# Patient Record
Sex: MALE | Race: WHITE | NOT HISPANIC OR LATINO | Employment: OTHER | URBAN - METROPOLITAN AREA
[De-identification: names, ages, dates, MRNs, and addresses within clinical notes are randomized per-mention and may not be internally consistent; named-entity substitution may affect disease eponyms.]

---

## 2022-01-29 ENCOUNTER — HOSPITAL ENCOUNTER (EMERGENCY)
Facility: HOSPITAL | Age: 52
Discharge: HOME | End: 2022-01-29
Attending: INTERNAL MEDICINE
Payer: MEDICARE

## 2022-01-29 VITALS
TEMPERATURE: 98 F | BODY MASS INDEX: 25.11 KG/M2 | WEIGHT: 160 LBS | HEART RATE: 84 BPM | DIASTOLIC BLOOD PRESSURE: 110 MMHG | SYSTOLIC BLOOD PRESSURE: 132 MMHG | OXYGEN SATURATION: 100 % | RESPIRATION RATE: 18 BRPM | HEIGHT: 67 IN

## 2022-01-29 DIAGNOSIS — R53.1 WEAKNESS GENERALIZED: Primary | ICD-10-CM

## 2022-01-29 DIAGNOSIS — R11.0 NAUSEA: ICD-10-CM

## 2022-01-29 LAB
ALBUMIN SERPL BCP-MCNC: 3.9 G/DL (ref 3.5–5.2)
ALP SERPL-CCNC: 89 U/L (ref 55–135)
ALT SERPL W/O P-5'-P-CCNC: 19 U/L (ref 10–44)
ANION GAP SERPL CALC-SCNC: 13 MMOL/L (ref 8–16)
AST SERPL-CCNC: 22 U/L (ref 10–40)
BASOPHILS # BLD AUTO: 0.02 K/UL (ref 0–0.2)
BASOPHILS NFR BLD: 0.3 % (ref 0–1.9)
BILIRUB SERPL-MCNC: 0.4 MG/DL (ref 0.1–1)
BILIRUB UR QL STRIP: NEGATIVE
BUN SERPL-MCNC: 16 MG/DL (ref 6–20)
CALCIUM SERPL-MCNC: 10 MG/DL (ref 8.7–10.5)
CHLORIDE SERPL-SCNC: 101 MMOL/L (ref 95–110)
CLARITY UR: CLEAR
CO2 SERPL-SCNC: 24 MMOL/L (ref 23–29)
COLOR UR: YELLOW
CREAT SERPL-MCNC: 1 MG/DL (ref 0.5–1.4)
DIFFERENTIAL METHOD: ABNORMAL
EOSINOPHIL # BLD AUTO: 0.5 K/UL (ref 0–0.5)
EOSINOPHIL NFR BLD: 7 % (ref 0–8)
ERYTHROCYTE [DISTWIDTH] IN BLOOD BY AUTOMATED COUNT: 14.2 % (ref 11.5–14.5)
EST. GFR  (AFRICAN AMERICAN): >60 ML/MIN/1.73 M^2
EST. GFR  (NON AFRICAN AMERICAN): >60 ML/MIN/1.73 M^2
GLUCOSE SERPL-MCNC: 106 MG/DL (ref 70–110)
GLUCOSE UR QL STRIP: NEGATIVE
HCT VFR BLD AUTO: 37.1 % (ref 40–54)
HGB BLD-MCNC: 12.1 G/DL (ref 14–18)
HGB UR QL STRIP: NEGATIVE
IMM GRANULOCYTES # BLD AUTO: 0.01 K/UL (ref 0–0.04)
IMM GRANULOCYTES NFR BLD AUTO: 0.2 % (ref 0–0.5)
KETONES UR QL STRIP: NEGATIVE
LEUKOCYTE ESTERASE UR QL STRIP: NEGATIVE
LIPASE SERPL-CCNC: 25 U/L (ref 4–60)
LYMPHOCYTES # BLD AUTO: 1.9 K/UL (ref 1–4.8)
LYMPHOCYTES NFR BLD: 29.1 % (ref 18–48)
MCH RBC QN AUTO: 26.1 PG (ref 27–31)
MCHC RBC AUTO-ENTMCNC: 32.6 G/DL (ref 32–36)
MCV RBC AUTO: 80 FL (ref 82–98)
MONOCYTES # BLD AUTO: 0.6 K/UL (ref 0.3–1)
MONOCYTES NFR BLD: 8.9 % (ref 4–15)
NEUTROPHILS # BLD AUTO: 3.6 K/UL (ref 1.8–7.7)
NEUTROPHILS NFR BLD: 54.5 % (ref 38–73)
NITRITE UR QL STRIP: NEGATIVE
NRBC BLD-RTO: 0 /100 WBC
PH UR STRIP: 6 [PH] (ref 5–8)
PLATELET # BLD AUTO: 225 K/UL (ref 150–450)
PMV BLD AUTO: 10.4 FL (ref 9.2–12.9)
POTASSIUM SERPL-SCNC: 3.7 MMOL/L (ref 3.5–5.1)
PROT SERPL-MCNC: 6.7 G/DL (ref 6–8.4)
PROT UR QL STRIP: NEGATIVE
RBC # BLD AUTO: 4.63 M/UL (ref 4.6–6.2)
SODIUM SERPL-SCNC: 138 MMOL/L (ref 136–145)
SP GR UR STRIP: >=1.03 (ref 1–1.03)
URN SPEC COLLECT METH UR: ABNORMAL
UROBILINOGEN UR STRIP-ACNC: NEGATIVE EU/DL
WBC # BLD AUTO: 6.6 K/UL (ref 3.9–12.7)

## 2022-01-29 PROCEDURE — 99284 EMERGENCY DEPT VISIT MOD MDM: CPT | Mod: 25

## 2022-01-29 PROCEDURE — 96374 THER/PROPH/DIAG INJ IV PUSH: CPT

## 2022-01-29 PROCEDURE — 85025 COMPLETE CBC W/AUTO DIFF WBC: CPT | Performed by: INTERNAL MEDICINE

## 2022-01-29 PROCEDURE — 63600175 PHARM REV CODE 636 W HCPCS: Performed by: INTERNAL MEDICINE

## 2022-01-29 PROCEDURE — 83690 ASSAY OF LIPASE: CPT | Performed by: INTERNAL MEDICINE

## 2022-01-29 PROCEDURE — 93005 ELECTROCARDIOGRAM TRACING: CPT

## 2022-01-29 PROCEDURE — 81003 URINALYSIS AUTO W/O SCOPE: CPT | Performed by: INTERNAL MEDICINE

## 2022-01-29 PROCEDURE — 80053 COMPREHEN METABOLIC PANEL: CPT | Performed by: INTERNAL MEDICINE

## 2022-01-29 PROCEDURE — 93010 ELECTROCARDIOGRAM REPORT: CPT | Mod: ,,, | Performed by: INTERNAL MEDICINE

## 2022-01-29 PROCEDURE — 93010 EKG 12-LEAD: ICD-10-PCS | Mod: ,,, | Performed by: INTERNAL MEDICINE

## 2022-01-29 RX ORDER — METFORMIN HYDROCHLORIDE 500 MG/1
500 TABLET, EXTENDED RELEASE ORAL DAILY
COMMUNITY
Start: 2022-01-24

## 2022-01-29 RX ORDER — ATORVASTATIN CALCIUM 10 MG/1
10 TABLET, FILM COATED ORAL DAILY
COMMUNITY
Start: 2021-12-03 | End: 2024-03-19

## 2022-01-29 RX ORDER — LEVOTHYROXINE SODIUM 50 UG/1
50 TABLET ORAL DAILY
COMMUNITY
Start: 2022-01-24

## 2022-01-29 RX ORDER — CLOPIDOGREL BISULFATE 75 MG/1
75 TABLET ORAL DAILY
COMMUNITY
Start: 2022-01-24

## 2022-01-29 RX ORDER — ONDANSETRON 2 MG/ML
4 INJECTION INTRAMUSCULAR; INTRAVENOUS
Status: COMPLETED | OUTPATIENT
Start: 2022-01-29 | End: 2022-01-29

## 2022-01-29 RX ORDER — RISPERIDONE 1 MG/1
1 TABLET ORAL 2 TIMES DAILY
COMMUNITY
Start: 2022-01-24 | End: 2024-03-19

## 2022-01-29 RX ORDER — LISINOPRIL AND HYDROCHLOROTHIAZIDE 10; 12.5 MG/1; MG/1
1 TABLET ORAL DAILY
COMMUNITY
Start: 2022-01-24 | End: 2024-03-19

## 2022-01-29 RX ADMIN — ONDANSETRON 4 MG: 2 INJECTION INTRAMUSCULAR; INTRAVENOUS at 08:01

## 2022-01-29 NOTE — ED NOTES
Spoke with [facility] and they stated they are still in the process of trying to find him a stretcher trip.

## 2022-01-29 NOTE — ED NOTES
[facility] was called and prior authorization was requested. Eufemia, from [facility], stated we would be contacted when transportation became available. Pt. Authorization number given by [facility] is [id number].

## 2022-01-29 NOTE — ED NOTES
[facility] contacted to get update of transportation. [facility] stated they have not received any information for transportation for [facility]TM and advised me to call [facility] to get updated on status of transportation set up.

## 2022-01-29 NOTE — ED NOTES
Attempted to call family to pick pt up but there was not an option to leave voicemail. Will attempt to call again.

## 2022-01-29 NOTE — ED NOTES
"Ems Reports to the patient stating "I feel hot" "I just dont feel well" patient is afebrile in triage and upon EMS arrival, EMS also states that the patient resides with the mother and father, the patients father is currently recovering from a hip replacement and the mother is caring for both and was concerned when he wouldn't help her with his normal morning routine. She was concerned about his nausea and his complaints of weakness and generalized malaise. Patient in calm, cooperative, AOx3 and in no acute distress. Currently the patient is sleeping.   "

## 2022-01-29 NOTE — ED NOTES
[facility] called to set up transportation with [facility] and [facility] stated they needed prioauthorization. [facility] gave me the number to [facility] at [phone] to receive prior authorization.

## 2022-01-29 NOTE — ED NOTES
Spoke with kiera at [facility] for update on transportation. Kiera stated dispatch was contacted and we should receive a call from [facility] to set up transportation soon.

## 2022-01-29 NOTE — ED NOTES
Called pt. Family and family stated they are unable to come get him because his caregivers are not able to get him in the car or in the house once they get home. I told family I would discuss situation with charge nurse and call them back when we find a solution.

## 2022-01-29 NOTE — ED PROVIDER NOTES
Encounter Date: 1/29/2022       History   No chief complaint on file.    THE PATIENT BROUGHT IN BY AMBULANCE COMPLAINING OF FEELING NAUSEATED AND HOT THIS MORNING.  THERE IS NO HISTORY OF ANY PAIN INCLUDING ABDOMINAL PAIN, VOMITING, CHEST PAIN, HEADACHE.  WHEN PARAMEDICS ARRIVED HE WAS AFEBRILE WITH NORMAL VITAL SIGNS.  THE PATIENT IS BOWEL MOVEMENT WAS YESTERDAY AND THERE IS NO HISTORY OF ANY BLEEDING.    THE PATIENT IS STATUS POST OLD LEFT CVA AND HE IS SENT TO [facility]ED [facility].  HE HAS COMPLETE PARALYSIS ON THE LEFT SIDE.        Review of patient's allergies indicates:   Allergen Reactions    Ampicillin      Past Medical History:   Diagnosis Date    Diabetes mellitus     Hypertension     Stroke     Thyroid disease      History reviewed. No pertinent surgical history.  History reviewed. No pertinent family history.  Social History     Tobacco Use    Smoking status: Never Smoker    Smokeless tobacco: Never Used   Substance Use Topics    Alcohol use: Never    Drug use: Never     Review of Systems   Constitutional: Negative for fever.   HENT: Negative for sore throat.    Respiratory: Negative for shortness of breath.    Cardiovascular: Negative for chest pain.   Gastrointestinal: Negative for nausea.   Genitourinary: Negative for dysuria.   Musculoskeletal: Negative for back pain.   Skin: Negative for rash.   Neurological: Negative for weakness.   Hematological: Does not bruise/bleed easily.       Physical Exam     Initial Vitals [01/29/22 0725]   BP Pulse Resp Temp SpO2   109/75 88 18 98 °F (36.7 °C) 99 %      MAP       --         Physical Exam    Vitals reviewed.  Constitutional: He appears well-developed.   HENT:   Head: Normocephalic.   Eyes: EOM are normal. Pupils are equal, round, and reactive to light.   Neck: Neck supple.   Normal range of motion.  Cardiovascular: Normal rate and regular rhythm.   Pulmonary/Chest: Breath sounds normal.   Abdominal: Abdomen is soft. Bowel sounds are normal. There is no  abdominal tenderness. There is no guarding.   Musculoskeletal:         General: No tenderness or edema.      Cervical back: Normal range of motion and neck supple.     Neurological: He is alert and oriented to person, place, and time.   LEFT HEMIPLEGIA, ALERT ORIENTED FOLLOWS COMMANDS MOVES RIGHT SIDE.   Skin: Skin is warm.         ED Course   Procedures  Labs Reviewed   CBC W/ AUTO DIFFERENTIAL - Abnormal; Notable for the following components:       Result Value    Hemoglobin 12.1 (*)     Hematocrit 37.1 (*)     MCV 80 (*)     MCH 26.1 (*)     All other components within normal limits   URINALYSIS, REFLEX TO URINE CULTURE - Abnormal; Notable for the following components:    Specific Gravity, UA >=1.030 (*)     All other components within normal limits    Narrative:     Preferred Collection Type->Urine, Clean Catch  Specimen Source->Urine   COMPREHENSIVE METABOLIC PANEL   LIPASE        ECG Results          EKG 12-lead (Preliminary result)  Result time 01/29/22 07:47:04    ED Interpretation by Jerrell Weinstein MD (01/29/22 07:47:04, [facility], [place], operated by [facility], Emergency Medicine)    Normal sinus rhythm with a PAC, rate of 90 and no acute ischemic                            Imaging Results    None          Medications   ondansetron injection 4 mg (4 mg Intravenous Given 1/29/22 0811)                 ED Course as of 01/29/22 1008   Sat Jan 29, 2022   0747 EKG 12-lead [PW]   0804 CBC W/ AUTO DIFFERENTIAL(!) [PW]   0809 PT ASLEEP, NO NAUSEA. [PW]   0835 Lipase [PW]   0835 Comp. Metabolic Panel [PW]   1003 Urinalysis, Reflex to Urine Culture Urine, Clean Catch(!) [PW]   1004 THE PATIENT STATES HE IS HUNGRY AND WENT OVER LABS BUT HE SAYS NO LONGER NAUSEATED. [PW]      ED Course User Index  [PW] Jerrell Weinstein MD             Clinical Impression:   Final diagnoses:  [R11.0] Nausea  [R53.1] Weakness generalized (Primary)          ED Disposition Condition    Discharge Stable        ED Prescriptions     None        Follow-up  Information     Follow up With Specialties Details Why Contact Info    S. Raj Rosas MD Family Medicine Schedule an appointment as soon as possible for a visit in 2 days  [address] [address]  [address]  [facility] [place] [address]  [phone]             Jerrell Weinstein MD  01/29/22 1006

## 2022-04-22 ENCOUNTER — HOSPITAL ENCOUNTER (EMERGENCY)
Facility: HOSPITAL | Age: 52
Discharge: HOME | End: 2022-04-23
Attending: EMERGENCY MEDICINE
Payer: MEDICARE

## 2022-04-22 DIAGNOSIS — R63.0 APPETITE LOSS: ICD-10-CM

## 2022-04-22 DIAGNOSIS — N17.9 AKI (ACUTE KIDNEY INJURY): ICD-10-CM

## 2022-04-22 DIAGNOSIS — B37.0 ORAL THRUSH: ICD-10-CM

## 2022-04-22 DIAGNOSIS — E86.0 DEHYDRATION: Primary | ICD-10-CM

## 2022-04-22 DIAGNOSIS — Z63.8 LACK OF FAMILY SUPPORT: ICD-10-CM

## 2022-04-22 LAB
ALBUMIN SERPL BCP-MCNC: 3.7 G/DL (ref 3.5–5.2)
ALP SERPL-CCNC: 66 U/L (ref 55–135)
ALT SERPL W/O P-5'-P-CCNC: 11 U/L (ref 10–44)
ANION GAP SERPL CALC-SCNC: 11 MMOL/L (ref 8–16)
AST SERPL-CCNC: 17 U/L (ref 10–40)
BASOPHILS # BLD AUTO: 0.02 K/UL (ref 0–0.2)
BASOPHILS NFR BLD: 0.3 % (ref 0–1.9)
BILIRUB SERPL-MCNC: 0.4 MG/DL (ref 0.1–1)
BILIRUB UR QL STRIP: NEGATIVE
BUN SERPL-MCNC: 23 MG/DL (ref 6–20)
CALCIUM SERPL-MCNC: 9.5 MG/DL (ref 8.7–10.5)
CHLORIDE SERPL-SCNC: 103 MMOL/L (ref 95–110)
CLARITY UR: CLEAR
CO2 SERPL-SCNC: 24 MMOL/L (ref 23–29)
COLOR UR: YELLOW
CREAT SERPL-MCNC: 1.6 MG/DL (ref 0.5–1.4)
DIFFERENTIAL METHOD: ABNORMAL
EOSINOPHIL # BLD AUTO: 0.2 K/UL (ref 0–0.5)
EOSINOPHIL NFR BLD: 3.4 % (ref 0–8)
ERYTHROCYTE [DISTWIDTH] IN BLOOD BY AUTOMATED COUNT: 13 % (ref 11.5–14.5)
EST. GFR  (AFRICAN AMERICAN): 57.2 ML/MIN/1.73 M^2
EST. GFR  (NON AFRICAN AMERICAN): 49.5 ML/MIN/1.73 M^2
GLUCOSE SERPL-MCNC: 83 MG/DL (ref 70–110)
GLUCOSE UR QL STRIP: NEGATIVE
HCT VFR BLD AUTO: 34.7 % (ref 40–54)
HGB BLD-MCNC: 11.3 G/DL (ref 14–18)
HGB UR QL STRIP: NEGATIVE
IMM GRANULOCYTES # BLD AUTO: 0.01 K/UL (ref 0–0.04)
IMM GRANULOCYTES NFR BLD AUTO: 0.2 % (ref 0–0.5)
KETONES UR QL STRIP: NEGATIVE
LEUKOCYTE ESTERASE UR QL STRIP: NEGATIVE
LYMPHOCYTES # BLD AUTO: 1.7 K/UL (ref 1–4.8)
LYMPHOCYTES NFR BLD: 29.1 % (ref 18–48)
MCH RBC QN AUTO: 26.7 PG (ref 27–31)
MCHC RBC AUTO-ENTMCNC: 32.6 G/DL (ref 32–36)
MCV RBC AUTO: 82 FL (ref 82–98)
MONOCYTES # BLD AUTO: 0.5 K/UL (ref 0.3–1)
MONOCYTES NFR BLD: 8 % (ref 4–15)
NEUTROPHILS # BLD AUTO: 3.5 K/UL (ref 1.8–7.7)
NEUTROPHILS NFR BLD: 59 % (ref 38–73)
NITRITE UR QL STRIP: NEGATIVE
NRBC BLD-RTO: 0 /100 WBC
PH UR STRIP: 6 [PH] (ref 5–8)
PLATELET # BLD AUTO: 183 K/UL (ref 150–450)
PMV BLD AUTO: 10.8 FL (ref 9.2–12.9)
POTASSIUM SERPL-SCNC: 4.3 MMOL/L (ref 3.5–5.1)
PROT SERPL-MCNC: 6.5 G/DL (ref 6–8.4)
PROT UR QL STRIP: NEGATIVE
RBC # BLD AUTO: 4.24 M/UL (ref 4.6–6.2)
SODIUM SERPL-SCNC: 138 MMOL/L (ref 136–145)
SP GR UR STRIP: 1.01 (ref 1–1.03)
URN SPEC COLLECT METH UR: NORMAL
UROBILINOGEN UR STRIP-ACNC: NEGATIVE EU/DL
WBC # BLD AUTO: 5.97 K/UL (ref 3.9–12.7)

## 2022-04-22 PROCEDURE — 80053 COMPREHEN METABOLIC PANEL: CPT | Performed by: NURSE PRACTITIONER

## 2022-04-22 PROCEDURE — 36415 COLL VENOUS BLD VENIPUNCTURE: CPT | Performed by: NURSE PRACTITIONER

## 2022-04-22 PROCEDURE — 25000003 PHARM REV CODE 250: Performed by: NURSE PRACTITIONER

## 2022-04-22 PROCEDURE — 99284 EMERGENCY DEPT VISIT MOD MDM: CPT | Mod: 25

## 2022-04-22 PROCEDURE — 96360 HYDRATION IV INFUSION INIT: CPT

## 2022-04-22 PROCEDURE — 81003 URINALYSIS AUTO W/O SCOPE: CPT | Performed by: NURSE PRACTITIONER

## 2022-04-22 PROCEDURE — 85025 COMPLETE CBC W/AUTO DIFF WBC: CPT | Performed by: NURSE PRACTITIONER

## 2022-04-22 RX ORDER — NYSTATIN 100000 [USP'U]/ML
4 SUSPENSION ORAL 4 TIMES DAILY
Qty: 160 ML | Refills: 0 | Status: SHIPPED | OUTPATIENT
Start: 2022-04-22 | End: 2022-05-02

## 2022-04-22 RX ORDER — ALBUTEROL SULFATE 90 UG/1
AEROSOL, METERED RESPIRATORY (INHALATION)
COMMUNITY
Start: 2022-04-05

## 2022-04-22 RX ORDER — PRAVASTATIN SODIUM 40 MG/1
40 TABLET ORAL DAILY
COMMUNITY
Start: 2022-04-05

## 2022-04-22 RX ADMIN — SODIUM CHLORIDE 1000 ML: 0.9 INJECTION, SOLUTION INTRAVENOUS at 05:04

## 2022-04-22 SDOH — SOCIAL DETERMINANTS OF HEALTH (SDOH): OTHER SPECIFIED PROBLEMS RELATED TO PRIMARY SUPPORT GROUP: Z63.8

## 2022-04-22 NOTE — ED NOTES
Pt is awake alert and orientated. Pt denies any pain or weakness at this time. Pt states that he has not had an appetite for the last two to three days, therefor he has not eaten or had any fluids. Pt states that he is not having any abd pain or any symptoms. Pt states that he has left sided deficits due to a previous stroke but denies any new symptoms at this time. Provided pt with warm blanket, placed fall risk band on patient and placed call light by patient and provided him with education not to get up.

## 2022-04-22 NOTE — ED TRIAGE NOTES
Per [facility] pt is coming from home. Pt is bed bound due to previous stroke pt is not able to use his left side any due to stroke. [facility] states that pt has not been eating or drinking the last two days. [facility] states home health came to his house and his blood pressure was very low so they called [facility] to transport pt. Pt denies any pain at this time. Pt is awake alert and orientated. Pt states that he has not ate or drank in 3 days due to no appetitie.

## 2022-04-23 VITALS
RESPIRATION RATE: 20 BRPM | SYSTOLIC BLOOD PRESSURE: 108 MMHG | HEIGHT: 64 IN | HEART RATE: 68 BPM | TEMPERATURE: 97 F | DIASTOLIC BLOOD PRESSURE: 49 MMHG | OXYGEN SATURATION: 97 % | BODY MASS INDEX: 34.15 KG/M2 | WEIGHT: 200 LBS

## 2022-04-23 RX ORDER — SODIUM CHLORIDE 9 MG/ML
1000 INJECTION, SOLUTION INTRAVENOUS
Status: DISCONTINUED | OUTPATIENT
Start: 2022-04-23 | End: 2022-04-23 | Stop reason: HOSPADM

## 2022-04-23 NOTE — ED NOTES
[facility] contacted to line up transportation. Pt. Updated on timeline and understands transportation may take a while.

## 2022-04-23 NOTE — ED PROVIDER NOTES
Encounter Date: 4/22/2022       History     Chief Complaint   Patient presents with    Hypotension     The patient came here by [facility],c/o weakness and dehydration, loss of appetite. He is history of left side paralysis from old CVA. He stated he lives with elderly parents who cook for him, assist daily activities. Recently he lost appetite, had one meal a day, didn't drink much, can't remember when he had last meals.     The history is provided by the patient.   General Illness   The current episode started several days ago. The problem occurs continuously. The problem has been unchanged. The pain is at a severity of 0/10. Nothing relieves the symptoms. Nothing aggravates the symptoms. Pertinent negatives include no fever, no decreased vision, no double vision, no eye itching, no photophobia, no abdominal pain, no constipation, no diarrhea, no congestion, no ear discharge, no ear pain, no headaches, no hearing loss, no mouth sores, no rhinorrhea, no sore throat, no stridor, no swollen glands, no discharge, no pain and no eye redness.     Review of patient's allergies indicates:   Allergen Reactions    Ampicillin      Past Medical History:   Diagnosis Date    Diabetes mellitus     Hypertension     Stroke     Thyroid disease      History reviewed. No pertinent surgical history.  History reviewed. No pertinent family history.  Social History     Tobacco Use    Smoking status: Never Smoker    Smokeless tobacco: Never Used   Substance Use Topics    Alcohol use: Never    Drug use: Never     Review of Systems   Constitutional: Positive for activity change, appetite change, fatigue and unexpected weight change. Negative for fever.   HENT: Negative for congestion, ear discharge, ear pain, hearing loss, mouth sores, rhinorrhea and sore throat.    Eyes: Negative for double vision, photophobia, pain, discharge, redness and itching.   Respiratory: Negative for stridor.    Gastrointestinal: Negative for abdominal pain,  constipation and diarrhea.   Neurological: Negative for headaches.   All other systems reviewed and are negative.      Physical Exam     Initial Vitals   BP Pulse Resp Temp SpO2   04/22/22 1644 04/22/22 1642 04/22/22 1642 04/22/22 1642 04/22/22 1642   102/62 87 14 97.2 °F (36.2 °C) 98 %      MAP       --                Physical Exam    Nursing note and vitals reviewed.  Constitutional: He appears well-developed.   HENT:   Head: Normocephalic and atraumatic.   Mouth/Throat:       Eyes: Pupils are equal, round, and reactive to light.   Cardiovascular: Normal rate.   Pulmonary/Chest: Breath sounds normal.   Abdominal: Abdomen is soft. He exhibits no distension and no mass. There is no abdominal tenderness. There is no rebound and no guarding.     Neurological: He is alert and oriented to person, place, and time.   Left side paralysis, bed bound, speech intact   Skin: Skin is warm and dry.   Psychiatric: He has a normal mood and affect.         ED Course   Procedures  Labs Reviewed   CBC W/ AUTO DIFFERENTIAL - Abnormal; Notable for the following components:       Result Value    RBC 4.24 (*)     Hemoglobin 11.3 (*)     Hematocrit 34.7 (*)     MCH 26.7 (*)     All other components within normal limits   COMPREHENSIVE METABOLIC PANEL - Abnormal; Notable for the following components:    BUN 23 (*)     Creatinine 1.6 (*)     eGFR if  57.2 (*)     eGFR if non  49.5 (*)     All other components within normal limits   URINALYSIS, REFLEX TO URINE CULTURE    Narrative:     Preferred Collection Type->Urine, Clean Catch  Specimen Source->Urine          Imaging Results    None          Medications   sodium chloride 0.9% bolus 1,000 mL (0 mLs Intravenous Stopped 4/22/22 1821)     Medical Decision Making:   Differential Diagnosis:   Weakness, loss of appetite, lack of family support, decreased kidney function   ED Management:  IV fluid  Treat oral thrush  Home health referral   Patient was advised to  follow up with his PCP for home health, assistance for daily activities from outside resources.  Please return for new, changing, or worsening pain. The patient was also instructed that follow up with a primary care physician is strongly recommended after any visit to the ED.  Patient expressed understanding and agreed with treatment plan and was discharged in stable condition.                         Clinical Impression:   Final diagnoses:  [E86.0] Dehydration (Primary)  [R63.0] Appetite loss  [B37.0] Oral thrush  [Z63.8] Lack of family support  [N17.9] JAJA (acute kidney injury)          ED Disposition Condition    Discharge Stable        ED Prescriptions     Medication Sig Dispense Start Date End Date Auth. Provider    nystatin (MYCOSTATIN) 100,000 unit/mL suspension Take 4 mLs (400,000 Units total) by mouth 4 (four) times daily. for 10 days 160 mL 4/22/2022 5/2/2022 Terri Summers NP        Follow-up Information     Follow up With Specialties Details Why Contact Info    SJuan Jose Rosas MD Family Medicine In 2 days  [address]  [address]  [facility] [address]  [phone]      [facility] Emergency Medicine  If symptoms worsen [address] [facility] [address] [phone]           Terri Summers NP  04/22/22 6321

## 2024-03-19 ENCOUNTER — OFFICE VISIT (OUTPATIENT)
Dept: PODIATRY | Facility: CLINIC | Age: 54
End: 2024-03-19
Payer: MEDICARE

## 2024-03-19 VITALS
HEART RATE: 95 BPM | HEIGHT: 64 IN | SYSTOLIC BLOOD PRESSURE: 108 MMHG | RESPIRATION RATE: 16 BRPM | BODY MASS INDEX: 34.33 KG/M2 | DIASTOLIC BLOOD PRESSURE: 76 MMHG

## 2024-03-19 DIAGNOSIS — E11.49 CONTROLLED TYPE 2 DIABETES MELLITUS WITH OTHER NEUROLOGIC COMPLICATION, WITHOUT LONG-TERM CURRENT USE OF INSULIN: ICD-10-CM

## 2024-03-19 DIAGNOSIS — B35.1 ONYCHOMYCOSIS OF TOENAIL: ICD-10-CM

## 2024-03-19 DIAGNOSIS — M21.372 FOOT DROP, LEFT: ICD-10-CM

## 2024-03-19 DIAGNOSIS — M20.5X9 MALLET TOE, UNSPECIFIED LATERALITY: ICD-10-CM

## 2024-03-19 DIAGNOSIS — E11.9 COMPREHENSIVE DIABETIC FOOT EXAMINATION, TYPE 2 DM, ENCOUNTER FOR: Primary | ICD-10-CM

## 2024-03-19 PROCEDURE — 99999 PR PBB SHADOW E&M-EST. PATIENT-LVL IV: CPT | Mod: PBBFAC,,, | Performed by: PODIATRIST

## 2024-03-19 PROCEDURE — 1159F MED LIST DOCD IN RCRD: CPT | Mod: CPTII,S$GLB,, | Performed by: PODIATRIST

## 2024-03-19 PROCEDURE — 3074F SYST BP LT 130 MM HG: CPT | Mod: CPTII,S$GLB,, | Performed by: PODIATRIST

## 2024-03-19 PROCEDURE — 3008F BODY MASS INDEX DOCD: CPT | Mod: CPTII,S$GLB,, | Performed by: PODIATRIST

## 2024-03-19 PROCEDURE — 99202 OFFICE O/P NEW SF 15 MIN: CPT | Mod: S$GLB,,, | Performed by: PODIATRIST

## 2024-03-19 PROCEDURE — 3078F DIAST BP <80 MM HG: CPT | Mod: CPTII,S$GLB,, | Performed by: PODIATRIST

## 2024-03-19 RX ORDER — MEGESTROL ACETATE 40 MG/ML
SUSPENSION ORAL
COMMUNITY
Start: 2024-03-16

## 2024-03-19 RX ORDER — NEOMYCIN SULFATE, POLYMYXIN B SULFATE AND DEXAMETHASONE 3.5; 10000; 1 MG/ML; [USP'U]/ML; MG/ML
SUSPENSION/ DROPS OPHTHALMIC
COMMUNITY
Start: 2024-01-30 | End: 2024-03-19

## 2024-03-19 RX ORDER — RISPERIDONE 0.25 MG/1
TABLET ORAL
COMMUNITY
Start: 2024-03-01

## 2024-03-19 RX ORDER — BACLOFEN 20 MG/1
20 TABLET ORAL 3 TIMES DAILY
COMMUNITY

## 2024-03-19 RX ORDER — IBUPROFEN 600 MG/1
600 TABLET ORAL EVERY 6 HOURS PRN
COMMUNITY
Start: 2024-01-29

## 2024-03-19 RX ORDER — MIDODRINE HYDROCHLORIDE 2.5 MG/1
2.5 TABLET ORAL 2 TIMES DAILY
COMMUNITY
Start: 2024-03-04

## 2024-03-19 RX ORDER — IPRATROPIUM BROMIDE AND ALBUTEROL SULFATE 2.5; .5 MG/3ML; MG/3ML
SOLUTION RESPIRATORY (INHALATION)
COMMUNITY
Start: 2024-02-08

## 2024-03-19 RX ORDER — CETIRIZINE HYDROCHLORIDE 10 MG/1
10 TABLET ORAL DAILY
COMMUNITY

## 2024-03-19 RX ORDER — FLUTICASONE PROPIONATE 50 MCG
SPRAY, SUSPENSION (ML) NASAL
COMMUNITY
Start: 2024-02-27

## 2024-03-19 RX ORDER — POLYMYXIN B SULFATE AND TRIMETHOPRIM 1; 10000 MG/ML; [USP'U]/ML
SOLUTION OPHTHALMIC
COMMUNITY
Start: 2024-01-29 | End: 2024-03-19

## 2024-03-19 RX ORDER — SODIUM CHLORIDE 0.9 % (FLUSH) 0.9 %
SYRINGE (ML) INJECTION
COMMUNITY
Start: 2024-01-29 | End: 2024-03-19

## 2024-03-19 RX ORDER — DOXYCYCLINE 100 MG/1
100 CAPSULE ORAL
COMMUNITY
Start: 2024-03-06 | End: 2024-05-15

## 2024-03-19 RX ORDER — OXYBUTYNIN CHLORIDE 10 MG/1
10 TABLET, EXTENDED RELEASE ORAL
COMMUNITY
Start: 2024-03-04

## 2024-03-19 RX ORDER — TOBRAMYCIN 3 MG/ML
SOLUTION/ DROPS OPHTHALMIC
COMMUNITY
Start: 2024-03-06

## 2024-03-19 RX ORDER — DOCUSATE SODIUM 100 MG/1
100 CAPSULE, LIQUID FILLED ORAL 2 TIMES DAILY
COMMUNITY

## 2024-03-19 RX ORDER — METRONIDAZOLE 500 MG/1
500 TABLET ORAL 3 TIMES DAILY
COMMUNITY
Start: 2024-01-29 | End: 2024-03-19

## 2024-03-19 RX ORDER — LEVOFLOXACIN 250 MG/1
TABLET ORAL
COMMUNITY
Start: 2024-01-30 | End: 2024-03-19

## 2024-03-19 RX ORDER — PANTOPRAZOLE SODIUM 40 MG/1
40 TABLET, DELAYED RELEASE ORAL
COMMUNITY
Start: 2024-03-18

## 2024-03-19 RX ORDER — LEVOFLOXACIN 750 MG/1
750 TABLET ORAL
COMMUNITY
Start: 2024-01-29 | End: 2024-03-19

## 2024-03-19 RX ORDER — FLUCONAZOLE 100 MG/1
100 TABLET ORAL
COMMUNITY
Start: 2023-11-20 | End: 2024-03-19

## 2024-05-14 ENCOUNTER — TELEPHONE (OUTPATIENT)
Dept: PODIATRY | Facility: CLINIC | Age: 54
End: 2024-05-14
Payer: MEDICARE

## 2024-05-14 NOTE — TELEPHONE ENCOUNTER
----- Message from Ruth Negron sent at 5/14/2024  1:57 PM CDT -----  Type:  Sooner Appointment Request    Caller is requesting a sooner appointment.  Caller declined first available appointment listed below.  Caller will not accept being placed on the waitlist and is requesting a message be sent to doctor.    Name of Caller:  Geri with [facility]  When is the first available appointment?  N/a  Symptoms:  infected ingrown toenail  Would the patient rather a call back or a response via [facility]? call  Best Call Back Number: [phone]   Additional Information:

## 2024-05-15 ENCOUNTER — OFFICE VISIT (OUTPATIENT)
Dept: PODIATRY | Facility: CLINIC | Age: 54
End: 2024-05-15
Payer: MEDICARE

## 2024-05-15 VITALS
TEMPERATURE: 98 F | RESPIRATION RATE: 18 BRPM | HEIGHT: 64 IN | DIASTOLIC BLOOD PRESSURE: 84 MMHG | BODY MASS INDEX: 29.33 KG/M2 | HEART RATE: 73 BPM | WEIGHT: 171.81 LBS | SYSTOLIC BLOOD PRESSURE: 145 MMHG

## 2024-05-15 DIAGNOSIS — L03.031 ABSCESS OF GREAT TOENAIL, RIGHT: ICD-10-CM

## 2024-05-15 DIAGNOSIS — Z99.3 DEPENDENT ON WHEELCHAIR: ICD-10-CM

## 2024-05-15 DIAGNOSIS — E11.49 CONTROLLED TYPE 2 DIABETES MELLITUS WITH OTHER NEUROLOGIC COMPLICATION, WITHOUT LONG-TERM CURRENT USE OF INSULIN: ICD-10-CM

## 2024-05-15 DIAGNOSIS — L03.031 CELLULITIS OF GREAT TOE OF RIGHT FOOT: Primary | ICD-10-CM

## 2024-05-15 PROCEDURE — 3077F SYST BP >= 140 MM HG: CPT | Mod: CPTII,S$GLB,, | Performed by: PODIATRIST

## 2024-05-15 PROCEDURE — 3079F DIAST BP 80-89 MM HG: CPT | Mod: CPTII,S$GLB,, | Performed by: PODIATRIST

## 2024-05-15 PROCEDURE — 99214 OFFICE O/P EST MOD 30 MIN: CPT | Mod: 25,S$GLB,, | Performed by: PODIATRIST

## 2024-05-15 PROCEDURE — 3008F BODY MASS INDEX DOCD: CPT | Mod: CPTII,S$GLB,, | Performed by: PODIATRIST

## 2024-05-15 PROCEDURE — 1159F MED LIST DOCD IN RCRD: CPT | Mod: CPTII,S$GLB,, | Performed by: PODIATRIST

## 2024-05-15 PROCEDURE — 99999 PR PBB SHADOW E&M-EST. PATIENT-LVL V: CPT | Mod: PBBFAC,,, | Performed by: PODIATRIST

## 2024-05-15 PROCEDURE — 1160F RVW MEDS BY RX/DR IN RCRD: CPT | Mod: CPTII,S$GLB,, | Performed by: PODIATRIST

## 2024-05-15 PROCEDURE — 11730 AVULSION NAIL PLATE SIMPLE 1: CPT | Mod: T5,S$GLB,, | Performed by: PODIATRIST

## 2024-05-15 RX ORDER — CLINDAMYCIN HYDROCHLORIDE 300 MG/1
300 CAPSULE ORAL EVERY 6 HOURS
COMMUNITY
Start: 2024-05-02 | End: 2024-05-15

## 2024-05-15 RX ORDER — CHLORHEXIDINE GLUCONATE ORAL RINSE 1.2 MG/ML
SOLUTION DENTAL
COMMUNITY
Start: 2024-04-10

## 2024-05-15 RX ORDER — DOXYCYCLINE 100 MG/1
100 CAPSULE ORAL EVERY 12 HOURS
Qty: 28 CAPSULE | Refills: 0 | Status: SHIPPED | OUTPATIENT
Start: 2024-05-15 | End: 2024-05-29

## 2024-05-15 RX ORDER — IBUPROFEN 800 MG/1
800 TABLET ORAL 3 TIMES DAILY
COMMUNITY
Start: 2024-04-25

## 2024-05-15 NOTE — PROGRESS NOTES
Subjective:       Patient ID: Ousmane Gaspar is a 53 y.o. male.    Chief Complaint: Diabetes Mellitus, Toe Pain, and Infection  Patient  with diabetes presents with escort from [facility] for evaluation infection right great toe.  Relates it has been present for about 2 weeks, patient has been on clindamycin during this period of time and not much improvement.  Patient with limited speech is alert and aware of what is occurring, does confirm pain and responds to pain on pressure of the area      Past Medical History:   Diagnosis Date    Autoimmune thyroiditis     CVA (cerebrovascular accident)     Depression     Diabetes mellitus     Diabetes mellitus, type 2     Hyperlipidemia     Hypertension     Hypothyroidism     Mood disorder     Stroke     Thyroid disease      History reviewed. No pertinent surgical history.  No family history on file.  Social History     Socioeconomic History    Marital status: Single   Tobacco Use    Smoking status: Never    Smokeless tobacco: Never   Substance and Sexual Activity    Alcohol use: Never    Drug use: Never       Current Outpatient Medications   Medication Sig Dispense Refill    baclofen (LIORESAL) 20 MG tablet Take 20 mg by mouth 3 (three) times daily.      cetirizine (ZYRTEC) 10 MG tablet Take 10 mg by mouth once daily.      chlorhexidine (PERIDEX) 0.12 % solution SMARTSIG:By Mouth      clopidogreL (PLAVIX) 75 mg tablet Take 75 mg by mouth once daily.      docusate sodium (COLACE) 100 MG capsule Take 100 mg by mouth 2 (two) times daily.      fluticasone propionate (FLONASE) 50 mcg/actuation nasal spray by Each Nostril route.      levothyroxine (SYNTHROID) 50 MCG tablet Take 50 mcg by mouth once daily.      megestroL (MEGACE) 400 mg/10 mL (40 mg/mL) Susp Take by mouth.      metFORMIN (GLUCOPHAGE-XR) 500 MG ER 24hr tablet Take 500 mg by mouth once daily.      midodrine (PROAMATINE) 2.5 MG Tab Take 2.5 mg by mouth 2 (two) times daily.      oxybutynin (DITROPAN-XL) 10  "MG 24 hr tablet Take 10 mg by mouth.      pantoprazole (PROTONIX) 40 MG tablet Take 40 mg by mouth.      pravastatin (PRAVACHOL) 40 MG tablet Take 40 mg by mouth once daily.      risperiDONE (RISPERDAL) 0.25 MG Tab Take by mouth.      tobramycin sulfate 0.3% (TOBREX) 0.3 % ophthalmic solution Place into both eyes.      albuterol (PROVENTIL/VENTOLIN HFA) 90 mcg/actuation inhaler SMARTSI Puff(s) Via Inhaler 4 Times Daily PRN      albuterol-ipratropium (DUO-NEB) 2.5 mg-0.5 mg/3 mL nebulizer solution Take by nebulization.      doxycycline (VIBRAMYCIN) 100 MG Cap Take 1 capsule (100 mg total) by mouth every 12 (twelve) hours. for 14 days 28 capsule 0    ibuprofen (ADVIL,MOTRIN) 600 MG tablet Take 600 mg by mouth every 6 (six) hours as needed.      ibuprofen (ADVIL,MOTRIN) 800 MG tablet Take 800 mg by mouth 3 (three) times daily.       No current facility-administered medications for this visit.     Review of patient's allergies indicates:   Allergen Reactions    Ampicillin     Penicillin      Other Reaction(s): Other (See Comments)       Review of Systems   Cardiovascular:  Positive for leg swelling.   Musculoskeletal:  Positive for gait problem.   All other systems reviewed and are negative.      Objective:      Vitals:    05/15/24 1012   BP: (!) 145/84   Pulse: 73   Resp: 18   Temp: 97.8 °F (36.6 °C)   TempSrc: Oral   Weight: 77.9 kg (171 lb 12.8 oz)   Height: 5' 4" (1.626 m)     Physical Exam  Vitals and nursing note reviewed. Exam conducted with a chaperone present.   Cardiovascular:      Pulses:           Dorsalis pedis pulses are 2+ on the right side and 2+ on the left side.        Posterior tibial pulses are 1+ on the right side and 1+ on the left side.   Pulmonary:      Effort: Pulmonary effort is normal.   Musculoskeletal:         General: Tenderness present.      Right foot: Decreased range of motion. Deformity (Class/mallet toes 2nd and 3rd digits bilateral, drop foot left) present.      Left foot: " Decreased range of motion. Deformity present.      Comments: Limited mobility, wheelchair, could not transfer   Feet:      Right foot:      Skin integrity: Skin breakdown, erythema (cellulitic medial and base right hallux nail,  very edematous erythematous with mild calor and serosanguineous/bloody drainage) and warmth present.      Toenail Condition: Right toenails are abnormally thick and ingrown. Fungal disease present.     Left foot:      Skin integrity: No skin breakdown.   Skin:     Capillary Refill: Capillary refill takes 2 to 3 seconds.      Findings: Erythema present.      Comments:  Cellulitis right hallux   Neurological:      Mental Status: He is alert.                         1. Cellulitis of great toe of right foot    2. Abscess of great toenail, right    3. Controlled type 2 diabetes mellitus with other neurologic complication, without long-term current use of insulin    4. Dependent on wheelchair        Plan:            NAIL AVULSION TOTAL  RIGHT HALLUX   DOXYCYCLINE 100 MG B.I.D. TIMES 14 DAYS   ORDERS WILL IN [facility]    Cellulitis of the right hallux with evidence of the prior nail debridement on the medial aspect,  possible attempt to remove ingrown nail.  With infection across the cuticle  possible injury pushes the entire nail plate back into the cuticle.  Attempted to debride, too painful for patient.  Discussed with patient anesthetizing the toe to remove the nail to allow it to drain so infection can resolve.  Patient verbally consented and caregiver /escort discussed and reviewed with patient  injections to numb up the toe, removal of the nail to resolve infection, patient verbalized understanding and consent today  See procedure report attached  Total nail avulsion right  Patient tolerated very well  Mild to moderate amount of bloody drainage was flushed from the area, bleeding controlled with a Telfa, gauze and Coban dressing  Orders were sent with escort  Start  DOXYCYCLINE  100 mg b.I.d. today  times 14 days  Once daily dressing change with a soft gauze bandage after 15 minute Epson salt soak x1 week  2nd week once daily soft gauze bandage dressing to keep clean and dry the 2nd week until our follow-up  At risk for complications due to diabetes  Sensation is fully intact which is helpful  Patient and caregiver were in understanding and agreement with treatment plan.  I counseled the patient on their conditions, implications and medical management.  Instructed to contact the office with any changes, questions, concerns, worsening of symptoms.   Total face to face time 30 minutes, exam, assessment, treatment, discussion, additional time for review of chart prior to and following appointment and visit documentation, consultation and coordination of care.  Additional time required for procedure/total nail avulsion right hallux  Follow up 2 weeks    This note was created using [employer] voice recognition software that occasionally misinterpreted phrases or words.

## 2024-05-16 NOTE — PROCEDURES
Nail Removal    Date/Time: 5/15/2024 11:00 AM    Performed by: Trina Sepulveda DPM  Authorized by: Trina Sepulveda DPM    Consent Done?:  Yes (Verbal)  Location:     Location:  Right foot    Location detail:  Right big toe  Anesthesia:     Anesthesia:  Digital block    Local anesthetic:  Topical anesthetic, lidocaine 1% without epinephrine and bupivacaine 0.5% without epinephrine    Anesthetic total (ml):  6 (3mL each)  Procedure Details:     Preparation:  Skin prepped with alcohol    Amount removed:  Complete    Wedge excision of skin of nail fold: No      Nail bed sutured?: No      Nail matrix removed:  None    Dressing applied:  Antibiotic ointment (thoroughly flushed with sterile saline, triple antibiotic, Telfa, gauze, Coban applied)    Patient tolerance:  Patient tolerated the procedure well with no immediate complications      Nursing home instructions/orders printed and sent with patient

## 2024-06-18 ENCOUNTER — OFFICE VISIT (OUTPATIENT)
Dept: PODIATRY | Facility: CLINIC | Age: 54
End: 2024-06-18
Payer: MEDICARE

## 2024-06-18 VITALS
WEIGHT: 171.81 LBS | DIASTOLIC BLOOD PRESSURE: 86 MMHG | HEART RATE: 106 BPM | BODY MASS INDEX: 29.33 KG/M2 | SYSTOLIC BLOOD PRESSURE: 137 MMHG | RESPIRATION RATE: 18 BRPM | HEIGHT: 64 IN

## 2024-06-18 DIAGNOSIS — L03.031 CELLULITIS OF GREAT TOE OF RIGHT FOOT: Primary | ICD-10-CM

## 2024-06-18 DIAGNOSIS — E11.49 CONTROLLED TYPE 2 DIABETES MELLITUS WITH OTHER NEUROLOGIC COMPLICATION, WITHOUT LONG-TERM CURRENT USE OF INSULIN: ICD-10-CM

## 2024-06-18 PROCEDURE — 99999 PR PBB SHADOW E&M-EST. PATIENT-LVL V: CPT | Mod: PBBFAC,,, | Performed by: PODIATRIST

## 2024-06-18 PROCEDURE — 3075F SYST BP GE 130 - 139MM HG: CPT | Mod: CPTII,S$GLB,, | Performed by: PODIATRIST

## 2024-06-18 PROCEDURE — 3079F DIAST BP 80-89 MM HG: CPT | Mod: CPTII,S$GLB,, | Performed by: PODIATRIST

## 2024-06-18 PROCEDURE — 3008F BODY MASS INDEX DOCD: CPT | Mod: CPTII,S$GLB,, | Performed by: PODIATRIST

## 2024-06-18 PROCEDURE — 99213 OFFICE O/P EST LOW 20 MIN: CPT | Mod: S$GLB,,, | Performed by: PODIATRIST

## 2024-06-18 PROCEDURE — 1159F MED LIST DOCD IN RCRD: CPT | Mod: CPTII,S$GLB,, | Performed by: PODIATRIST

## 2024-06-18 RX ORDER — RISPERIDONE 0.5 MG/1
0.5 TABLET ORAL
COMMUNITY
Start: 2024-06-13

## 2024-06-18 RX ORDER — BACLOFEN 10 MG/1
10 TABLET ORAL
COMMUNITY
Start: 2024-06-10

## 2024-06-19 NOTE — PROGRESS NOTES
Subjective:       Patient ID: Ousmane Gaspar is a 53 y.o. male.    Chief Complaint: Follow-up (Abscess/), Abscess, and Diabetes Mellitus  Patient with diabetes presents with escort from [facility] for follow-up cellulitis with abscess right great toe, total nail avulsion 1 month ago.  Patient with limited speech is alert and confirms no pain of this toe today      Past Medical History:   Diagnosis Date    Autoimmune thyroiditis     CVA (cerebrovascular accident)     Depression     Diabetes mellitus     Diabetes mellitus, type 2     Hyperlipidemia     Hypertension     Hypothyroidism     Mood disorder     Stroke     Thyroid disease      History reviewed. No pertinent surgical history.  No family history on file.  Social History     Socioeconomic History    Marital status: Single   Tobacco Use    Smoking status: Never    Smokeless tobacco: Never   Substance and Sexual Activity    Alcohol use: Never    Drug use: Never       Current Outpatient Medications   Medication Sig Dispense Refill    baclofen (LIORESAL) 10 MG tablet Take 10 mg by mouth.      baclofen (LIORESAL) 20 MG tablet Take 20 mg by mouth 3 (three) times daily.      cetirizine (ZYRTEC) 10 MG tablet Take 10 mg by mouth once daily.      clopidogreL (PLAVIX) 75 mg tablet Take 75 mg by mouth once daily.      fluticasone propionate (FLONASE) 50 mcg/actuation nasal spray by Each Nostril route.      levothyroxine (SYNTHROID) 50 MCG tablet Take 50 mcg by mouth once daily.      megestroL (MEGACE) 400 mg/10 mL (40 mg/mL) Susp Take by mouth.      metFORMIN (GLUCOPHAGE-XR) 500 MG ER 24hr tablet Take 500 mg by mouth once daily.      midodrine (PROAMATINE) 2.5 MG Tab Take 2.5 mg by mouth 2 (two) times daily.      oxybutynin (DITROPAN-XL) 10 MG 24 hr tablet Take 10 mg by mouth.      pravastatin (PRAVACHOL) 40 MG tablet Take 40 mg by mouth once daily.      risperiDONE (RISPERDAL) 0.5 MG Tab Take 0.5 mg by mouth.      albuterol  "(PROVENTIL/VENTOLIN HFA) 90 mcg/actuation inhaler SMARTSI Puff(s) Via Inhaler 4 Times Daily PRN      albuterol-ipratropium (DUO-NEB) 2.5 mg-0.5 mg/3 mL nebulizer solution Take by nebulization.      chlorhexidine (PERIDEX) 0.12 % solution SMARTSIG:By Mouth      docusate sodium (COLACE) 100 MG capsule Take 100 mg by mouth 2 (two) times daily.      ibuprofen (ADVIL,MOTRIN) 600 MG tablet Take 600 mg by mouth every 6 (six) hours as needed.      ibuprofen (ADVIL,MOTRIN) 800 MG tablet Take 800 mg by mouth 3 (three) times daily.      pantoprazole (PROTONIX) 40 MG tablet Take 40 mg by mouth.      risperiDONE (RISPERDAL) 0.25 MG Tab Take by mouth.      tobramycin sulfate 0.3% (TOBREX) 0.3 % ophthalmic solution Place into both eyes.       No current facility-administered medications for this visit.     Review of patient's allergies indicates:   Allergen Reactions    Ampicillin     Penicillin      Other Reaction(s): Other (See Comments)       Review of Systems   Cardiovascular:  Positive for leg swelling.   Musculoskeletal:  Positive for gait problem.        Confined to wheelchair   All other systems reviewed and are negative.      Objective:      Vitals:    24 1112   BP: 137/86   Pulse: 106   Resp: 18   Weight: 77.9 kg (171 lb 12.8 oz)   Height: 5' 4" (1.626 m)     Physical Exam  Vitals and nursing note reviewed. Exam conducted with a chaperone present.   Cardiovascular:      Pulses:           Dorsalis pedis pulses are 2+ on the right side and 2+ on the left side.        Posterior tibial pulses are 1+ on the right side and 1+ on the left side.   Pulmonary:      Effort: Pulmonary effort is normal.   Musculoskeletal:      Right foot: Decreased range of motion. Deformity (Class/mallet toes 2nd and 3rd digits bilateral, drop foot left) present.      Left foot: Decreased range of motion. Deformity present.   Feet:      Right foot:      Skin integrity: No erythema (Area of total nail avulsion right hallux dry well " healed and smooth the nail bed, no pain or complications).      Toenail Condition: Right toenails are long.      Left foot:      Skin integrity: No erythema.      Toenail Condition: Left toenails are long.   Skin:     Capillary Refill: Capillary refill takes 2 to 3 seconds.      Comments:  Cellulitis right hallux   Neurological:      General: No focal deficit present.      Mental Status: He is alert.                   1. Cellulitis of great toe of right foot    2. Controlled type 2 diabetes mellitus with other neurologic complication, without long-term current use of insulin          Plan:           Area of total nail avulsion well healed, nail bed dry, cellulitis completely resolved, no pain  Patient is at risk for complications due to diabetes, limited mobility and infrequent inspections of feet but has good sensation   Exam today with no complications regarding skin and nails due to diabetes  Few of the lesser digit nails growing around the end of the toe due to curvature/contracture at the end of the toe, these areas were debrided with no evidence of ingrown nail or infection  Skin in good condition with no complications at this time  Explained it is important to have regular foot checks contact the office immediately with any area of pain, redness or swelling that does not improve in a few days  Caregiver was in understanding and agreement with treatment plan.  I counseled the patient on their conditions, implications and medical management.  Instructed to contact the office with any changes, questions, concerns, worsening of symptoms.   Total face to face time 20 minutes, exam, assessment, treatment, discussion, additional time for review of chart prior to and following appointment and visit documentation, consultation and coordination of care.    Follow up 3 months    This note was created using [employer] voice recognition software that occasionally misinterpreted phrases or words.

## 2024-09-18 ENCOUNTER — OFFICE VISIT (OUTPATIENT)
Dept: PODIATRY | Facility: CLINIC | Age: 54
End: 2024-09-18
Payer: MEDICARE

## 2024-09-18 VITALS
DIASTOLIC BLOOD PRESSURE: 79 MMHG | BODY MASS INDEX: 35.37 KG/M2 | HEIGHT: 64 IN | SYSTOLIC BLOOD PRESSURE: 119 MMHG | WEIGHT: 207.19 LBS | HEART RATE: 107 BPM

## 2024-09-18 DIAGNOSIS — L60.8 DEFORMITY OF NAIL BED: ICD-10-CM

## 2024-09-18 DIAGNOSIS — E11.49 CONTROLLED TYPE 2 DIABETES MELLITUS WITH OTHER NEUROLOGIC COMPLICATION, WITHOUT LONG-TERM CURRENT USE OF INSULIN: Primary | ICD-10-CM

## 2024-09-18 DIAGNOSIS — L60.8 ACQUIRED DYSMORPHIC TOENAIL: ICD-10-CM

## 2024-09-18 PROCEDURE — 3008F BODY MASS INDEX DOCD: CPT | Mod: CPTII,S$GLB,, | Performed by: PODIATRIST

## 2024-09-18 PROCEDURE — 3074F SYST BP LT 130 MM HG: CPT | Mod: CPTII,S$GLB,, | Performed by: PODIATRIST

## 2024-09-18 PROCEDURE — 3078F DIAST BP <80 MM HG: CPT | Mod: CPTII,S$GLB,, | Performed by: PODIATRIST

## 2024-09-18 PROCEDURE — 99999 PR PBB SHADOW E&M-EST. PATIENT-LVL IV: CPT | Mod: PBBFAC,,, | Performed by: PODIATRIST

## 2024-09-18 PROCEDURE — 99213 OFFICE O/P EST LOW 20 MIN: CPT | Mod: S$GLB,,, | Performed by: PODIATRIST

## 2024-09-18 PROCEDURE — 1159F MED LIST DOCD IN RCRD: CPT | Mod: CPTII,S$GLB,, | Performed by: PODIATRIST

## 2024-09-18 PROCEDURE — 1160F RVW MEDS BY RX/DR IN RCRD: CPT | Mod: CPTII,S$GLB,, | Performed by: PODIATRIST

## 2024-09-18 RX ORDER — GUAIFENESIN AND DEXTROMETHORPHAN HYDROBROMIDE 10; 100 MG/5ML; MG/5ML
5 SYRUP ORAL
COMMUNITY

## 2024-09-18 RX ORDER — ACETAMINOPHEN 325 MG/1
325 TABLET ORAL EVERY 6 HOURS PRN
COMMUNITY

## 2024-09-18 RX ORDER — DOXYCYCLINE 100 MG/1
100 CAPSULE ORAL 2 TIMES DAILY
COMMUNITY
Start: 2024-08-27

## 2024-09-18 RX ORDER — NITROGLYCERIN 0.4 MG/1
0.4 TABLET SUBLINGUAL EVERY 5 MIN PRN
COMMUNITY

## 2024-09-18 RX ORDER — ONDANSETRON 4 MG/1
TABLET, ORALLY DISINTEGRATING ORAL
COMMUNITY
Start: 2024-07-01

## 2024-09-18 NOTE — PROGRESS NOTES
Subjective:       Patient ID: Ousmane Gaspar is a 53 y.o. male.    Chief Complaint: Follow-up, Diabetes Mellitus, and Nail Problem  Patient with diabetes presents with escort from [facility] for follow-up due to type 2 diabetes, previous cellulitis with abscess right great toe, total nail avulsion approximately 4 months ago. Patient denies pain and would like to start getting the area wet, nursing home has not allow him to get it wet      Past Medical History:   Diagnosis Date    Autoimmune thyroiditis     CVA (cerebrovascular accident)     Depression     Diabetes mellitus     Diabetes mellitus, type 2     Hyperlipidemia     Hypertension     Hypothyroidism     Mood disorder     Stroke     Thyroid disease      History reviewed. No pertinent surgical history.  No family history on file.  Social History     Socioeconomic History    Marital status: Single   Tobacco Use    Smoking status: Never    Smokeless tobacco: Never   Substance and Sexual Activity    Alcohol use: Never    Drug use: Never       Current Outpatient Medications   Medication Sig Dispense Refill    acetaminophen (TYLENOL) 325 MG tablet Take 325 mg by mouth every 6 (six) hours as needed for Pain.      baclofen (LIORESAL) 20 MG tablet Take 20 mg by mouth 3 (three) times daily.      cetirizine (ZYRTEC) 10 MG tablet Take 10 mg by mouth once daily.      clopidogreL (PLAVIX) 75 mg tablet Take 75 mg by mouth once daily.      dextromethorphan-guaiFENesin  mg/5 ml (ROBITUSSIN-DM)  mg/5 mL liquid Take 5 mLs by mouth every 12 (twelve) hours.      docusate sodium (COLACE) 100 MG capsule Take 100 mg by mouth 2 (two) times daily.      doxycycline (VIBRAMYCIN) 100 MG Cap Take 100 mg by mouth 2 (two) times daily.      fluticasone propionate (FLONASE) 50 mcg/actuation nasal spray by Each Nostril route.      ibuprofen (ADVIL,MOTRIN) 800 MG tablet Take 800 mg by mouth 3 (three) times daily.      levothyroxine (SYNTHROID) 50 MCG tablet Take 50 mcg by  "mouth once daily.      metFORMIN (GLUCOPHAGE-XR) 500 MG ER 24hr tablet Take 500 mg by mouth once daily.      midodrine (PROAMATINE) 2.5 MG Tab Take 2.5 mg by mouth 2 (two) times daily.      nitroGLYCERIN (NITROSTAT) 0.4 MG SL tablet Place 0.4 mg under the tongue every 5 (five) minutes as needed for Chest pain.      ondansetron (ZOFRAN-ODT) 4 MG TbDL Take by mouth.      oxybutynin (DITROPAN-XL) 10 MG 24 hr tablet Take 10 mg by mouth.      pantoprazole (PROTONIX) 40 MG tablet Take 40 mg by mouth.      pravastatin (PRAVACHOL) 40 MG tablet Take 40 mg by mouth once daily.      risperiDONE (RISPERDAL) 0.5 MG Tab Take 0.5 mg by mouth.       No current facility-administered medications for this visit.     Review of patient's allergies indicates:   Allergen Reactions    Ampicillin     Penicillin      Other Reaction(s): Other (See Comments)       Review of Systems   Cardiovascular:  Positive for leg swelling.   Musculoskeletal:  Positive for gait problem.        Confined to wheelchair   All other systems reviewed and are negative.      Objective:      Vitals:    09/18/24 0913   BP: 119/79   Pulse: 107   Weight: 94 kg (207 lb 3.2 oz)   Height: 5' 4" (1.626 m)     Physical Exam  Vitals and nursing note reviewed. Exam conducted with a chaperone present.   Cardiovascular:      Pulses:           Dorsalis pedis pulses are 2+ on the right side and 2+ on the left side.        Posterior tibial pulses are 1+ on the right side and 1+ on the left side.   Pulmonary:      Effort: Pulmonary effort is normal.   Musculoskeletal:      Right foot: Decreased range of motion. Deformity (Class/mallet toes 2nd and 3rd digits bilateral, drop foot left) present.      Left foot: Decreased range of motion. Deformity present.   Feet:      Right foot:      Skin integrity: No erythema (Well healed total nail avulsion, evidence of dystrophic fungal nail growth, flat, dry, no pain).      Toenail Condition: Fungal disease present.     Left foot:      Skin " integrity: No erythema.   Skin:     Capillary Refill: Capillary refill takes 2 to 3 seconds.      Comments:  Cellulitis right hallux   Neurological:      General: No focal deficit present.      Mental Status: He is alert.                         1. Controlled type 2 diabetes mellitus with other neurologic complication, without long-term current use of insulin    2. Acquired dysmorphic toenail    3. Deformity of nail bed            Plan:         Diabetic foot check  Advised patient areas well healed but unfortunately due to damage of the nail bed proper nail is most likely not able to grow out, there is fungal involvement of the nail bed, this needs to be kept smooth and flat or it will put him at greater chance for recurrence of infection  Thickness of the nail was reduced and smooth  We reviewed care and maintenance of the area and potential complications due to diabetes  Skin in good condition with no complications at this time  Can get it wet  He is to immediately alert the nurse if there is any pain in this toe or anywhere on his feet so they can thoroughly check his feet for him  Explained how important to have regular foot checks contact the office with any area of pain that does not improve in a few days  Caregiver was in understanding and agreement with treatment plan.  I counseled the patient on their conditions, implications and medical management.  Instructed to contact the office with any changes, questions, concerns, worsening of symptoms.   Total face to face time 20 minutes, exam, assessment, treatment, discussion, additional time for review of chart prior to and following appointment and visit documentation, consultation and coordination of care.    Follow up 3 months    This note was created using [employer] voice recognition software that occasionally misinterpreted phrases or words.